# Patient Record
Sex: MALE | Race: WHITE | NOT HISPANIC OR LATINO | Employment: FULL TIME | URBAN - METROPOLITAN AREA
[De-identification: names, ages, dates, MRNs, and addresses within clinical notes are randomized per-mention and may not be internally consistent; named-entity substitution may affect disease eponyms.]

---

## 2019-10-26 ENCOUNTER — HOSPITAL ENCOUNTER (EMERGENCY)
Facility: HOSPITAL | Age: 49
Discharge: HOME | End: 2019-10-26
Attending: EMERGENCY MEDICINE
Payer: COMMERCIAL

## 2019-10-26 VITALS
DIASTOLIC BLOOD PRESSURE: 74 MMHG | BODY MASS INDEX: 37.04 KG/M2 | OXYGEN SATURATION: 100 % | HEIGHT: 67 IN | SYSTOLIC BLOOD PRESSURE: 143 MMHG | RESPIRATION RATE: 16 BRPM | HEART RATE: 70 BPM | TEMPERATURE: 100 F | WEIGHT: 236 LBS

## 2019-10-26 DIAGNOSIS — H47.10 OPTIC NERVE EDEMA: Primary | ICD-10-CM

## 2019-10-26 LAB
ANION GAP SERPL CALC-SCNC: 11 MMOL/L (ref 8–16)
BASOPHILS # BLD AUTO: 0.04 K/UL (ref 0–0.2)
BASOPHILS NFR BLD: 0.6 % (ref 0–1.9)
BUN SERPL-MCNC: 13 MG/DL (ref 6–20)
CALCIUM SERPL-MCNC: 9.5 MG/DL (ref 8.7–10.5)
CHLORIDE SERPL-SCNC: 102 MMOL/L (ref 95–110)
CO2 SERPL-SCNC: 26 MMOL/L (ref 23–29)
CREAT SERPL-MCNC: 1 MG/DL (ref 0.5–1.4)
CRP SERPL-MCNC: 4.2 MG/L (ref 0–8.2)
DIFFERENTIAL METHOD: ABNORMAL
EOSINOPHIL # BLD AUTO: 0.1 K/UL (ref 0–0.5)
EOSINOPHIL NFR BLD: 1.7 % (ref 0–8)
ERYTHROCYTE [DISTWIDTH] IN BLOOD BY AUTOMATED COUNT: 12.1 % (ref 11.5–14.5)
ERYTHROCYTE [SEDIMENTATION RATE] IN BLOOD BY WESTERGREN METHOD: 36 MM/HR (ref 0–23)
EST. GFR  (AFRICAN AMERICAN): >60 ML/MIN/1.73 M^2
EST. GFR  (NON AFRICAN AMERICAN): >60 ML/MIN/1.73 M^2
GLUCOSE SERPL-MCNC: 78 MG/DL (ref 70–110)
HCT VFR BLD AUTO: 39.9 % (ref 40–54)
HGB BLD-MCNC: 13.7 G/DL (ref 14–18)
IMM GRANULOCYTES # BLD AUTO: 0.02 K/UL (ref 0–0.04)
IMM GRANULOCYTES NFR BLD AUTO: 0.3 % (ref 0–0.5)
LYMPHOCYTES # BLD AUTO: 1.4 K/UL (ref 1–4.8)
LYMPHOCYTES NFR BLD: 19.1 % (ref 18–48)
MCH RBC QN AUTO: 32.2 PG (ref 27–31)
MCHC RBC AUTO-ENTMCNC: 34.3 G/DL (ref 32–36)
MCV RBC AUTO: 94 FL (ref 82–98)
MONOCYTES # BLD AUTO: 0.7 K/UL (ref 0.3–1)
MONOCYTES NFR BLD: 9.5 % (ref 4–15)
NEUTROPHILS # BLD AUTO: 4.9 K/UL (ref 1.8–7.7)
NEUTROPHILS NFR BLD: 68.8 % (ref 38–73)
NRBC BLD-RTO: 0 /100 WBC
PLATELET # BLD AUTO: 152 K/UL (ref 150–350)
PMV BLD AUTO: 10.3 FL (ref 9.2–12.9)
POTASSIUM SERPL-SCNC: 3.8 MMOL/L (ref 3.5–5.1)
RBC # BLD AUTO: 4.25 M/UL (ref 4.6–6.2)
SODIUM SERPL-SCNC: 139 MMOL/L (ref 136–145)
WBC # BLD AUTO: 7.17 K/UL (ref 3.9–12.7)

## 2019-10-26 PROCEDURE — 99284 PR EMERGENCY DEPT VISIT,LEVEL IV: ICD-10-PCS | Mod: ,,, | Performed by: EMERGENCY MEDICINE

## 2019-10-26 PROCEDURE — 99284 EMERGENCY DEPT VISIT MOD MDM: CPT | Mod: ,,, | Performed by: EMERGENCY MEDICINE

## 2019-10-26 PROCEDURE — 86140 C-REACTIVE PROTEIN: CPT

## 2019-10-26 PROCEDURE — 85025 COMPLETE CBC W/AUTO DIFF WBC: CPT

## 2019-10-26 PROCEDURE — 85652 RBC SED RATE AUTOMATED: CPT

## 2019-10-26 PROCEDURE — 80048 BASIC METABOLIC PNL TOTAL CA: CPT

## 2019-10-26 PROCEDURE — 99283 EMERGENCY DEPT VISIT LOW MDM: CPT

## 2019-10-26 RX ORDER — PRAVASTATIN SODIUM 40 MG/1
40 TABLET ORAL DAILY
COMMUNITY

## 2019-10-26 RX ORDER — HYDROCHLOROTHIAZIDE 25 MG/1
25 TABLET ORAL DAILY
COMMUNITY

## 2019-10-26 RX ORDER — NIFEDIPINE 90 MG/1
60 TABLET, EXTENDED RELEASE ORAL DAILY
COMMUNITY

## 2019-10-26 RX ORDER — VALSARTAN 320 MG/1
320 TABLET ORAL DAILY
COMMUNITY

## 2019-10-26 NOTE — ED NOTES
Patient identifiers verified and correct for Chaparro Edwards.    LOC: The patient is awake, alert and aware of environment with an appropriate affect, the patient is oriented x 3 and speaking appropriately.    APPEARANCE: Patient resting comfortably and in no acute distress, patient is clean and well groomed, patient's clothing is properly fastened.    SKIN: The skin is warm and dry, color consistent with ethnicity, patient has normal skin turgor and moist mucus membranes, skin intact, no breakdown or bruising noted.    MUSCULOSKELETAL: Patient moving all extremities spontaneously, no obvious swelling or deformities noted.    RESPIRATORY: Airway is open and patent, respirations are spontaneous, patient has a normal effort and rate, no accessory muscle use noted, bilateral breath sounds clear  CARDIAC: Patient has a normal rate and regular rhythm, no periphreal edema noted, capillary refill < 3 seconds.  ABDOMEN: Soft and non tender to palpation, no distention noted, normoactive bowel sounds present in all four quadrants.

## 2019-10-26 NOTE — ED PROVIDER NOTES
"Encounter Date: 10/26/2019    SCRIBE #1 NOTE: I, Orly Pal, am scribing for, and in the presence of,  Dr. Marquita Parra. I have scribed the entire note.       History     Chief Complaint   Patient presents with    Eye Pain     decrease vision rt eye- onset 2 days ago. Referred by urgent care to r/o detached retina.      Time patient was seen by the provider: 6:16 PM      The patient is a 49 y.o. male with co-morbidities including: HTN and HLD, who presents to the ED with a complaint of right eye vision problems. Patient was sent to the ED by an Urgent Care in [place] who are concerned about possible retinal detachment. He reports that he noticed that he had difficulty seeing out of the bottom of his right eye-"haziness" when he looked down, normal when he looked up. The next morning, he had a "dark grey haze" over his entire field of vision from his right eye. Denies pain in the eye, eye redness, eye drainage, or photophobia. Patient also complains of "flashing lights" in vision that have remained about the same I severity and occurences since the vision problems began. Lights appear all over his field of vision and occur intermittently. Also complains of a currently resolved headache this morning located at the top of his head. Does not get headaches often, but he did not take any medication and said it was manageable. Endorses chronic sinus problems that have flaring up recently. Denies numbness tingling, or weakness in extremities.   Denies fever, chills, cough, shortness of breath, chest pain, nausea, vomiting, diarrhea, urinary problems, problems moving bowels, abdominal pain. Patient wears reading glasses. Does not wear contacts.    The history is provided by the patient, the spouse and medical records.     Review of patient's allergies indicates:  No Known Allergies  Past Medical History:   Diagnosis Date    Hypertension      History reviewed. No pertinent surgical history.  History reviewed. No " pertinent family history.  Social History     Tobacco Use    Smoking status: Former Smoker    Smokeless tobacco: Never Used   Substance Use Topics    Alcohol use: Yes    Drug use: Not on file     Review of Systems   Constitutional: Negative for chills and fever.   HENT: Positive for congestion and sinus pressure.    Eyes: Positive for visual disturbance. Negative for photophobia, pain and discharge.   Respiratory: Negative for cough and shortness of breath.    Cardiovascular: Negative for chest pain.   Gastrointestinal: Negative for abdominal pain, constipation, diarrhea, nausea and vomiting.   Genitourinary: Negative for difficulty urinating and dysuria.   Musculoskeletal: Negative for back pain.   Skin: Negative for rash.   Neurological: Negative for weakness, numbness and headaches.       Physical Exam     Initial Vitals [10/26/19 1719]   BP Pulse Resp Temp SpO2   138/74 103 16 99.5 °F (37.5 °C) 99 %      MAP       --         Physical Exam    Nursing note and vitals reviewed.  Constitutional: He appears well-developed and well-nourished. No distress.   HENT:   Head: Normocephalic and atraumatic.   Right Ear: External ear normal.   Left Ear: External ear normal.   Mouth/Throat: Oropharynx is clear and moist.   Eyes: EOM are normal. Pupils are equal, round, and reactive to light. Right eye exhibits no discharge. Left eye exhibits no discharge. Right conjunctiva is not injected. Left conjunctiva is not injected.   On my attempt at examination of retina with ophthalmoscope, I was unable to visualize it without dilation of pupils. No photophobia.   Neck: Normal range of motion. Neck supple.   Cardiovascular: Normal rate, regular rhythm and normal heart sounds. Exam reveals no gallop and no friction rub.    No murmur heard.  Pulmonary/Chest: Breath sounds normal. No respiratory distress. He has no wheezes. He has no rhonchi. He has no rales.   Abdominal: Soft. He exhibits no distension. There is no tenderness.    Musculoskeletal: Normal range of motion.   Neurological: He is oriented to person, place, and time. He has normal strength. No cranial nerve deficit or sensory deficit.   Skin: Skin is warm and dry.   Psychiatric: His behavior is normal. Thought content normal.         ED Course   Procedures  Labs Reviewed   CBC W/ AUTO DIFFERENTIAL - Abnormal; Notable for the following components:       Result Value    RBC 4.25 (*)     Hemoglobin 13.7 (*)     Hematocrit 39.9 (*)     Mean Corpuscular Hemoglobin 32.2 (*)     All other components within normal limits   SEDIMENTATION RATE - Abnormal; Notable for the following components:    Sed Rate 36 (*)     All other components within normal limits   C-REACTIVE PROTEIN   BASIC METABOLIC PANEL          Imaging Results    None          Medical Decision Making:   History:   Old Medical Records: I decided to obtain old medical records.  Old Records Summarized: other records.       <> Summary of Records: There are no records for this patient in our system.    Initial Assessment:   49 year old man sent from Urgent Care for possible retinal detachment who complains of decreased vision in right eye for 3 days.  My differential diagnoses include, but are not limited to: retinal detachment, vitreous hemorrhage. I considered, but doubt, temporal arteritis because the patient has no eye pain or current headache. No evidence of iritis or conjunctivitis on exam. Plan to discuss case with ophthalmology.             Scribe Attestation:   Scribe #1: I performed the above scribed service and the documentation accurately describes the services I performed. I attest to the accuracy of the note.    Attending Attestation:           Physician Attestation for Scribe:      Comments: I, Dr. Marquita Angelo, personally performed the services described in this documentation. All medical record entries made by the scribe were at my direction and in my presence.  I have reviewed the chart and agree that the  record reflects my personal performance and is accurate and complete. Marquita Angelo MD.  12:47 AM 10/27/2019      Attending ED Notes:   18:33:  Discussed case with ophthalmology and they will come and see the patient.    20:34:  Patient was evaluated by ophthalmology who stated that on their eye exam they saw optic nerve edema. They are requesting labs and a CT of the brain and orbits.      23:08:  Ophthalmology canceled imaging because the labs were reviewed and unremarkable. Patient was evaluated by an upper level opthalmology resident. they do not feel that the optic nerve edema is secondary to giant cell arteritis or an ischemic event that will require a stroke work up. They are recommending close follow up with them and neuro ophthalmology. They will contact the patient for appointment in 2 days on Monday (today is Saturday).              Clinical Impression:       ICD-10-CM ICD-9-CM   1. Optic nerve edema H47.10 377.00                                Marquita Angelo MD  10/27/19 0047

## 2019-10-26 NOTE — ED TRIAGE NOTES
Chaparro Edwards, a 49 y.o. male presents to the ED w/ complaint of progressive vision loss since Thursday in right eye.    Triage note:  Chief Complaint   Patient presents with    Eye Pain     decrease vision rt eye- onset 2 days ago. Referred by urgent care to r/o detached retina.      Review of patient's allergies indicates:  No Known Allergies  No past medical history on file.

## 2019-10-27 NOTE — CONSULTS
"Chief complaint/Reason for Consult:     History of Present Illness: Chaparro Edwards is a 49 y.o. male with a PMH of HTN who presents with a history of gray haze over the vision in his right eye since Thursday evening.  It started with an inferior gray haze and progressed to cover his entire vision from the right eye.  Endorses flashes of light and "shapes" in his vision. His right eye is his bad eye from childhood, and had to wear a patch as a child, but is unsure of the diagnosis.  Has a sligth headache today, but denies any headache at the  Onset of symptoms, denies any trauma.  Has had a recent cold/sinus infection.      POcularHx: patching as a child.  "bad right eye"     Current eye gtts: none      PMHx:  has a past medical history of Hypertension.     PSurgHx:  has no past surgical history on file.     Home Medications:   Prior to Admission medications    Medication Sig Start Date End Date Taking? Authorizing Provider   hydroCHLOROthiazide (HYDRODIURIL) 25 MG tablet Take 25 mg by mouth once daily.   Yes Historical Provider, MD   NIFEdipine (PROCARDIA XL) 90 MG (OSM) 24 hr tablet Take 60 mg by mouth once daily.   Yes Historical Provider, MD   pravastatin (PRAVACHOL) 40 MG tablet Take 40 mg by mouth once daily.   Yes Historical Provider, MD   valsartan (DIOVAN) 320 MG tablet Take 320 mg by mouth once daily.   Yes Historical Provider, MD        Medications this encounter:     Allergies: has No Known Allergies.     Social:  reports that he has quit smoking. He has never used smokeless tobacco. He reports that he drinks alcohol.     Family Hx: Mother has glaucoma. family history is not on file.     ROS: Negative x 10 except for complaints as described in HPI; negative for fever, chills, weight loss, nausea, vomiting, diarrhea, shortness of breath, nasal discharge, cough, abdominal pain, dyspnea, difficulty moving arms and legs, confusion, dysuria, palpitations, or chest pain     Ocular examination/Dilated fundus " "examination:  Base Eye Exam     Visual Acuity (Near)       Right Left    Dist cc 20/200 20/20    Correction:  Glasses          Tonometry (Tonopen, 7:38 PM)       Right Left    Pressure 19 19          Pupils       Dark Light Shape APD    Right 5  4 Round 1+APD    Left 5 3 Round None          Visual Fields       Right Left      Full    Restrictions Total superior temporal, inferior temporal, superior nasal, inferior nasal deficiencies           Extraocular Movement       Right Left     Full, Ortho Full, Ortho            Slit Lamp and Fundus Exam     External Exam       Right Left    External Normal Normal          Slit Lamp Exam       Right Left    Lids/Lashes Normal Normal    Conjunctiva/Sclera White and quiet White and quiet    Cornea Clear Clear    Anterior Chamber Deep and quiet Deep and quiet    Iris Round and reactive Round and reactive    Lens Clear Clear    Vitreous Normal Normal          Fundus Exam       Right Left    Disc grade 3 papiledema, single flame hemorrhage on ON Normal    C/D Ratio  0.1    Macula Normal Normal    Vessels tortous and attenuated (more than left eye) Normal    Periphery Normal Normal                Assessment/Plan:   1. NAION right eye  -Patient had sudden onset of gray haze, flashes, "shapes" since Thursday  -ESR very mildly elevated (39), CRP normal, platelets normal, low suspicion for GCA, especially due to patient age and lack of systemic symptoms  -Follow up outpatient with neuro-ophthalmology, Dr. Mccurdy, on Monday.    -Discussed with patient importance have following with a PCP to have comprehensive medical exam        Karissa Jones, PGY 2  Discussed with Dr. Gomez and Dr. Martell  "

## 2019-10-28 ENCOUNTER — OFFICE VISIT (OUTPATIENT)
Dept: OPHTHALMOLOGY | Facility: CLINIC | Age: 49
End: 2019-10-28
Payer: COMMERCIAL

## 2019-10-28 DIAGNOSIS — H47.011 NAION (NON-ARTERITIC ANTERIOR ISCHEMIC OPTIC NEUROPATHY), RIGHT EYE: Primary | ICD-10-CM

## 2019-10-28 PROCEDURE — 92004 COMPRE OPH EXAM NEW PT 1/>: CPT | Mod: S$GLB,,, | Performed by: OPHTHALMOLOGY

## 2019-10-28 PROCEDURE — 99999 PR PBB SHADOW E&M-EST. PATIENT-LVL III: ICD-10-PCS | Mod: PBBFAC,,, | Performed by: OPHTHALMOLOGY

## 2019-10-28 PROCEDURE — 99999 PR PBB SHADOW E&M-EST. PATIENT-LVL III: CPT | Mod: PBBFAC,,, | Performed by: OPHTHALMOLOGY

## 2019-10-28 PROCEDURE — 99213 OFFICE O/P EST LOW 20 MIN: CPT | Mod: PBBFAC | Performed by: OPHTHALMOLOGY

## 2019-10-28 PROCEDURE — 92004 PR EYE EXAM, NEW PATIENT,COMPREHESV: ICD-10-PCS | Mod: S$GLB,,, | Performed by: OPHTHALMOLOGY

## 2019-10-28 NOTE — PROGRESS NOTES
HPI     Patient here for ED follow up for NAION per .  Pt states OD vision about same since ED visit.  No eye pain.    I have personally interviewed the patient, reviewed the history and   examined the patient and agree with the technician's &/or resident's exam,   assessment and plan.    Last edited by Stan Mccurdy MD on 10/28/2019  3:47 PM. (History)            Assessment /Plan     For exam results, see Encounter Report.    NAION (non-arteritic anterior ischemic optic neuropathy), right eye      Mr. Edwards reported significant snoring at night. I recommended he contact his PCP for a possible sleep study. I will repeat his exam and visual field testing in three months.

## 2019-10-28 NOTE — LETTER
[facility] - Ophthalmology  [address]  [place] [address]  Phone: [phone]  Fax: [phone]   October 28, 2019    Karissa Jones MD  [address] [address]    Patient: Chaparro Edwards   MR Number: [id number]   YOB: 1970   Date of Visit: 10/28/2019       Dear Dr. Jones:    Thank you for referring Chaparro Edwards to me for evaluation. Here is my assessment and plan of care:    Assessment:  /Plan     For exam results, see Encounter Report.    NAION (non-arteritic anterior ischemic optic neuropathy), right eye      Mr. Edwards reported significant snoring at night. I recommended he contact his PCP for a possible sleep study. I will repeat his exam and visual field testing in three months.          Plan:  For exam results, see Encounter Report.    CLAYTONION (non-arteritic anterior ischemic optic neuropathy), right eye      Mr. Edwards reported significant snoring at night. I recommended he contact his PCP for a possible sleep study. I will repeat his exam and visual field testing in three months.            Below you will find my full exam findings. If you have questions, please do not hesitate to call me. I look forward to following Mr. Chaparro Edwards along with you.    Sincerely,          Stan Mccurdy MD       CC  Anya Linn NP             Base Eye Exam     Visual Acuity (Snellen - Linear)       Right Left    Dist sc 20/400 20/20    Dist ph sc 20/200           Tonometry (Applanation, 3:47 PM)       Right Left    Pressure 17 18          Pupils       Dark Light Shape React APD    Right 4 2 Round Brisk +3    Left 4 2 Round Brisk +3          Visual Fields       Right Left      Full    Restrictions Total inferior temporal, inferior nasal deficiencies           Neuro/Psych     Oriented x3:  Yes    Mood/Affect:  Normal            Slit Lamp and Fundus Exam     External Exam       Right Left    External Normal Normal          Slit Lamp Exam       Right Left     Lids/Lashes Normal Normal    Conjunctiva/Sclera White and quiet White and quiet    Cornea Clear Clear    Anterior Chamber Deep and quiet Deep and quiet    Iris Round and reactive Round and reactive    Lens Clear Clear    Vitreous Normal Normal          Fundus Exam       Right Left    Disc 3+ edema with  numerous splinter hemorrhages Crowded    C/D Ratio 0.0 0.0    Macula Normal Normal    Vessels Normal Normal    Periphery Normal Normal

## 2019-11-01 ENCOUNTER — TELEPHONE (OUTPATIENT)
Dept: OPHTHALMOLOGY | Facility: CLINIC | Age: 49
End: 2019-11-01

## 2019-11-01 NOTE — TELEPHONE ENCOUNTER
Returned phone call to  office. I advised her that  had labs in the ED and they would have to go through medical records to get them. perla

## 2019-11-01 NOTE — TELEPHONE ENCOUNTER
----- Message from Vadim Edi sent at 10/31/2019  3:54 PM CDT -----  Regarding: Request of Lab Results   Cassie Bennett from 's office is requesting recent lab results ordered by Dr. Mccurdy, please fax results to [phone].Her call back is [phone].     Thank you!

## 2019-12-13 ENCOUNTER — HOSPITAL ENCOUNTER (OUTPATIENT)
Dept: SLEEP MEDICINE | Facility: HOSPITAL | Age: 49
Discharge: HOME | End: 2019-12-13
Attending: NURSE PRACTITIONER
Payer: COMMERCIAL

## 2019-12-13 DIAGNOSIS — F51.12 INSUFFICIENT SLEEP SYNDROME: ICD-10-CM

## 2019-12-13 PROCEDURE — 95810 POLYSOM 6/> YRS 4/> PARAM: CPT

## 2019-12-28 ENCOUNTER — HOSPITAL ENCOUNTER (OUTPATIENT)
Dept: SLEEP MEDICINE | Facility: HOSPITAL | Age: 49
Discharge: HOME | End: 2019-12-28
Attending: NURSE PRACTITIONER
Payer: COMMERCIAL

## 2019-12-28 DIAGNOSIS — G47.33 OBSTRUCTIVE SLEEP APNEA (ADULT) (PEDIATRIC): ICD-10-CM

## 2019-12-28 PROCEDURE — 95811 POLYSOM 6/>YRS CPAP 4/> PARM: CPT
